# Patient Record
Sex: FEMALE | Race: WHITE | NOT HISPANIC OR LATINO | Employment: OTHER | URBAN - METROPOLITAN AREA
[De-identification: names, ages, dates, MRNs, and addresses within clinical notes are randomized per-mention and may not be internally consistent; named-entity substitution may affect disease eponyms.]

---

## 2018-08-08 ENCOUNTER — TRANSFERRED RECORDS (OUTPATIENT)
Dept: HEALTH INFORMATION MANAGEMENT | Facility: CLINIC | Age: 68
End: 2018-08-08

## 2018-08-14 ENCOUNTER — TRANSFERRED RECORDS (OUTPATIENT)
Dept: HEALTH INFORMATION MANAGEMENT | Facility: CLINIC | Age: 68
End: 2018-08-14

## 2018-08-22 ENCOUNTER — PRE VISIT (OUTPATIENT)
Dept: TRANSPLANT | Facility: CLINIC | Age: 68
End: 2018-08-22

## 2018-08-22 NOTE — TELEPHONE ENCOUNTER
Date of appointment: 8/30/2018   Diagnosis/reason for appointment:  CLL    Additional information:  Records from [facility] (in [place]) and [facility]    August 22, 2018 7:47 AM  Records received from [facility] and faxed to [facility]

## 2018-08-30 ENCOUNTER — OFFICE VISIT (OUTPATIENT)
Dept: TRANSPLANT | Facility: CLINIC | Age: 68
End: 2018-08-30
Attending: INTERNAL MEDICINE
Payer: MEDICARE

## 2018-08-30 VITALS
HEART RATE: 89 BPM | TEMPERATURE: 96.7 F | BODY MASS INDEX: 24.19 KG/M2 | OXYGEN SATURATION: 96 % | DIASTOLIC BLOOD PRESSURE: 78 MMHG | WEIGHT: 136.5 LBS | HEIGHT: 63 IN | RESPIRATION RATE: 16 BRPM | SYSTOLIC BLOOD PRESSURE: 124 MMHG

## 2018-08-30 DIAGNOSIS — C91.10 CLL (CHRONIC LYMPHOCYTIC LEUKEMIA) (H): Primary | ICD-10-CM

## 2018-08-30 PROCEDURE — G0463 HOSPITAL OUTPT CLINIC VISIT: HCPCS | Mod: ZF

## 2018-08-30 RX ORDER — MULTIPLE VITAMINS W/ MINERALS TAB 9MG-400MCG
1 TAB ORAL DAILY
COMMUNITY

## 2018-08-30 ASSESSMENT — PAIN SCALES - GENERAL: PAINLEVEL: NO PAIN (0)

## 2018-08-30 NOTE — NURSING NOTE
"Oncology Rooming Note    August 30, 2018 2:08 PM   Ana Francois is a 67 year old female who presents for:    Chief Complaint   Patient presents with     RECHECK     New Patient : CLL     Initial Vitals: /78 (BP Location: Right arm, Patient Position: Sitting, Cuff Size: Adult Regular)  Pulse 89  Temp 96.7  F (35.9  C) (Oral)  Resp 16  Ht 1.6 m (5' 3\")  Wt 61.9 kg (136 lb 8 oz)  SpO2 96%  BMI 24.18 kg/m2 Estimated body mass index is 24.18 kg/(m^2) as calculated from the following:    Height as of this encounter: 1.6 m (5' 3\").    Weight as of this encounter: 61.9 kg (136 lb 8 oz). Body surface area is 1.66 meters squared.  No Pain (0) Comment: Data Unavailable   No LMP recorded. Patient is postmenopausal.  Allergies reviewed: Yes  Medications reviewed: Yes    Medications: Medication refills not needed today.  Pharmacy name entered into [facility]: [facility] [address] [place] [address]    Clinical concerns: Patient states no further concerns at this time.     10 minutes for nursing intake (face to face time)     Susie Felton CMA              "

## 2018-08-30 NOTE — LETTER
2018       RE: Ana Francois  [address]  [place] [address]     Dear Colleague,    Thank you for referring your patient, Ana Francois, to the [facility] BLOOD AND MARROW TRANSPLANT. Please see a copy of my visit note below.       [facility] Oncology New Consult          Phuong Francois MRN# [id number]   Age: 67 year old YOB: 1950        Reason for consult: CLL       HPI:     67-year-old female with history of CLL is here for a second opinion for CLL.  Her regular oncologist is Dr. Judith Lopez from [place] [facility].      Diagnosis: CLL diagnosed in 2017, with normal cyogenetics  Treatment: No Rx    Oncologic history: In 2017, patient went to PCP for annual exam and was found to have left lump in the neck, cervical lymphadenopathy. CBC showed white count of 24.8 thousand, hemoglobin 13.7, platelet count of 204,000.  With 80% lymphocytes in the differential.  Flow cytometry from peripheral blood consistent with CLL: showed a population of lambda restricted B lymphocytes with positive CD23, CD43 and CD5. (CD19 brightly positive, moderately positive CD79b/CD45/CD23/CD22/CD43/CD5 and dimly positive for CD20 negative markers for CD2/CD 3/CD34///kappa/FMC 7/ CD10/ CD38).   Cytogenetics: Normal cytogenetics with no 17 P deletion or 11 q. Deletion  Hepatitis B and hepatitis C are non-reactive  Ig, IgA: 61, IgM: 33  Stage at diagnosis: Byrne stage I     Most recent CBC on 2018 shows WBC of 83,000, hemoglobin 13.4%, platelets 160 with differential showing 77% lymphocytes.  His chemistries sodium 143, potassium 4.2, chloride 107, bicarb 27, BUN 15, creatinine 0.86, calcium 9, albumin 4.3, total protein 6.5, total bilirubin 0.6, alkaline phosphatase 87, AST 30, ALT 19, , GFR 70    CT imaging 2018    CT of the neck on 2018 showing diffuse lymphadenopathy right neck level 5 lymph node 2.1 into 1.3 cm and left-sided level 5 lymph  node 1.5 and 1.2 cm  CT of the chest abdomen pelvis shows right axillary lymph node 3.1 into 1.6 cm and left axillary lymph node 3.3 and 1.6 cm.  Spleen is 10.7 into 11.2 into 9.3 cm.  Left para-aortic lymph nodes measures 2.5 and 1.8 cm.  She also has significant pelvic and retroperitoneal lymphadenopathy with the largest being right external iliac at 3.4 and 2.5 cm, no enlarged bilateral inguinal lymphadenopathy.    Interval Hx:  Over the last few months she has noticed gradual increase in size of LN in the necka nd noticed new LN in the groin and axilla. She however denies any other symptoms. No fever, night sweats and chills, wt loss, abdominal pain, nausea, vomiting, diarrhea, infectious symptoms. She has noticed some swelling in the legs, rt >lt since 2-3 months. It is stable. ROS is also positive for rash, itchy, raised and red, random occurrence like bug bite. She started having hives and itching a/w rash. She is taking benadryl on and off and steroid cream and seems to be under control. Dermatology biopsied one site which showed bug bite.     Review of system: Complete ROS otherwise negative         Past Medical History:    No significant PMHx  Basal cell Ca excised         Past Surgical History:   Gall bladder resection >10 yrs  Bunion surgery  Back surgery, microdiscetomy, herniated disc           Social History:     Social History     Social History     Marital status:      Spouse name: N/A     Number of children: N/A     Years of education: N/A     Occupational History     Not on file.     Social History Main Topics     Smoking status: Not on file     Smokeless tobacco: Not on file     Alcohol use Not on file     Drug use: Not on file     Sexual activity: Not on file     Other Topics Concern     Not on file     Social History Narrative   Drink occasionally once 2-3 weeks  Was a nurse, [employer]  Lives with , [place]  3 kids, 38, 37 and 32yrs.            Family History:   Great  "aunt: Breast cancer  FGM: Uterine cancer  Father: heart problems, mitral valve, CHF  Mother: Necrotic bowel          Allergies:   . Allergies not on file          Medications:     Current Outpatient Prescriptions   Medication     Ascorbic Acid (VITAMIN C PO)     DiphenhydrAMINE HCl (BENADRYL PO)     IBUPROFEN PO     multivitamin, therapeutic with minerals (MULTI-VITAMIN) TABS tablet     No current facility-administered medications for this visit.            Vital signs:                         Estimated body mass index is 24.18 kg/(m^2) as calculated from the following:    Height as of this encounter: 1.6 m (5' 3\").    Weight as of this encounter: 61.9 kg (136 lb 8 oz).    Physical exam:    Gen: Well built and nourished, not in any acute distress  HEENT: Mucous Membrane Moist, no oral lesions, no pallor, icterus  Neck: supple, b/l cervical LN in lower neck 1-2 cm, mobile and discrete  Lymph Nodes: B/l axillary LN rt >lt, multiple 2-3 cm, mobile and discrete, b/l inguinal LN 1-2 cm multiple  CVS: Regular Rate Rhythm, no murmurs  Resp: CTA, no added sounds  GI: Soft, non-tender, no Hepatospleenomegaly  Extremities: no edema   Skin: no bruises  Neuro: AAOx4. Cranial nerves grossly intact. Strength 5/5 throughout. Sensations grossly intact.          Data:   The labs and imaging were reviewed.    Assessment and Plan    #CLL, FERNANDEZ stage 1, normal risk cytogenetics-   Pt is otherwise healthy and relatively asymptomatic. She does not have high risk cytogenetics or cytopenias nor  bulk LN or splenomegaly which are all indications for treatment. Therefore, watchful waiting with regular CBC, CMP and imaging (only if indicated) is appropriate. Should she develop any these symptoms in future we can consider treatment.     We discussed about natural Hx of CLL and indications for treatment. She asked several intelligent questions which were answered to her satisfaction. She is happy to f/u with her regular Oncologist. She can " "contact us for any questions.    Staffed with Dr. Topete.    Surinder Terrazas  [facility] fellow  [phone]      Surinder Terrazas MD       Attending Addendum:  The patient was seen and evaluated. All medical records, testing results were reviewed and the plan was discussed with the fellow. The note above has been edited to reflect my findings.    Additional comments:  Pleasant 68 yo woman with CLL (without high risk cytogenetics) diagnosed in 2017 with steady WBC progression over the last year and some non-bulky KELSEY in the neck and above and below the diaphragm but with adequate Hgb and platelets and no B symptoms.    As above we reviewed CLL natural history, CLL risk statification by FISH/Cytogenetics, and approaches to observation and treatment. We agree with the current approach of observation with regular CBC checks and physical exams. We conveyed the importance of annual flu shots, vigilance about infection management, and consideration of \"killed\" shingles vaccine (shingrix) and to avoid any live viruses.     She is in agreement with the above and in agreement with observation and was happy to hear the consensus in recommendations. I gave her my card if questions come up in the future but she will be returning to her regular oncologist for ongoing management.    Mis Topete      Again, thank you for allowing me to participate in the care of your patient.      Sincerely,    Mis Topete MD    "

## 2018-08-30 NOTE — MR AVS SNAPSHOT
"              After Visit Summary   8/30/2018    Ana Francois    MRN: [id number]           Patient Information     Date Of Birth          1950        Visit Information        Provider Department      8/30/2018 2:00 PM Mis Topete MD [facility] Blood and Marrow Transplant        Today's Diagnoses     CLL (chronic lymphocytic leukemia) (H)    -  1          [facility] ([facility])  [address] [place] [address]  Phone: [phone]  Clinic Hours:   Monday-Thursday:7am to 7pm   Friday: 7am to 5pm   Weekends and holidays:    8am to noon (in general)  If your fever is 100.5  or greater,   call the clinic.  After hours call the   hospital at [phone] or   [phone]. Ask for the BMT   fellow on-call            Follow-ups after your visit        Who to contact     If you have questions or need follow up information about today's clinic visit or your schedule please contact [facility] BLOOD AND MARROW TRANSPLANT directly at [phone].  Normal or non-critical lab and imaging results will be communicated to you by MyChart, letter or phone within 4 business days after the clinic has received the results. If you do not hear from us within 7 days, please contact the clinic through MyChart or phone. If you have a critical or abnormal lab result, we will notify you by phone as soon as possible.  Submit refill requests through [employer] or call your pharmacy and they will forward the refill request to us. Please allow 3 business days for your refill to be completed.          Additional Information About Your Visit        Care EveryWhere ID     This is your Care EveryWhere ID. This could be used by other organizations to access your [facility] medical records  [id number]        Your Vitals Were     Pulse Temperature Respirations Height Pulse Oximetry BMI (Body Mass Index)    89 96.7  F (35.9  C) (Oral) 16 1.6 m (5' 3\") 96% 24.18 kg/m2       Blood Pressure from Last 3 Encounters: "   08/30/18 124/78    Weight from Last 3 Encounters:   08/30/18 61.9 kg (136 lb 8 oz)              Today, you had the following     No orders found for display       Recent Review Flowsheet Data     There is no flowsheet data to display.               Primary Care Provider Office Phone # Fax #    Lili Sanders [phone]       [facility] [address] [facility] [address]        Equal Access to Services     YAZAN BURK : Hadii aad ku hadasho Soomaali, waaxda luqadaha, qaybta kaalmada adeegyada, waxay idiin hayaan adeeg kharash la'luís . So [facility] [phone].    ATENCIÓN: Si habla español, tiene a higuera disposición servicios gratuitos de asistencia lingüística. Llame al [phone].    We comply with applicable federal civil rights laws and [place] laws. We do not discriminate on the basis of race, color, national origin, age, disability, sex, sexual orientation, or gender identity.            Thank you!     Thank you for choosing [facility] BLOOD AND MARROW TRANSPLANT  for your care. Our goal is always to provide you with excellent care. Hearing back from our patients is one way we can continue to improve our services. Please take a few minutes to complete the written survey that you may receive in the mail after your visit with us. Thank you!             Your Updated Medication List - Protect others around you: Learn how to safely use, store and throw away your medicines at www.disposemymeds.org.          This list is accurate as of 8/30/18  4:32 PM.  Always use your most recent med list.                   Brand Name Dispense Instructions for use Diagnosis    BENADRYL PO           IBUPROFEN PO           Multi-vitamin Tabs tablet      Take 1 tablet by mouth daily        VITAMIN C PO

## 2018-12-20 ENCOUNTER — TRANSFERRED RECORDS (OUTPATIENT)
Dept: HEALTH INFORMATION MANAGEMENT | Facility: CLINIC | Age: 68
End: 2018-12-20

## 2019-05-17 ENCOUNTER — TRANSFERRED RECORDS (OUTPATIENT)
Dept: HEALTH INFORMATION MANAGEMENT | Facility: CLINIC | Age: 69
End: 2019-05-17

## 2020-01-30 ENCOUNTER — TRANSFERRED RECORDS (OUTPATIENT)
Dept: HEALTH INFORMATION MANAGEMENT | Facility: CLINIC | Age: 70
End: 2020-01-30

## 2021-05-28 ENCOUNTER — RECORDS - HEALTHEAST (OUTPATIENT)
Dept: ADMINISTRATIVE | Facility: CLINIC | Age: 71
End: 2021-05-28

## 2024-03-11 ENCOUNTER — TRANSFERRED RECORDS (OUTPATIENT)
Dept: HEALTH INFORMATION MANAGEMENT | Facility: CLINIC | Age: 74
End: 2024-03-11
Payer: COMMERCIAL

## 2024-04-04 ENCOUNTER — TRANSFERRED RECORDS (OUTPATIENT)
Dept: HEALTH INFORMATION MANAGEMENT | Facility: CLINIC | Age: 74
End: 2024-04-04

## 2024-04-04 ENCOUNTER — HOSPITAL ENCOUNTER (EMERGENCY)
Facility: CLINIC | Age: 74
End: 2024-04-04
Payer: MEDICARE

## 2024-04-04 ENCOUNTER — HOSPITAL ENCOUNTER (EMERGENCY)
Facility: CLINIC | Age: 74
Discharge: OTHER INSTITUTION | End: 2024-04-04
Attending: FAMILY MEDICINE | Admitting: FAMILY MEDICINE
Payer: COMMERCIAL

## 2024-04-04 VITALS
HEIGHT: 63 IN | WEIGHT: 130 LBS | HEART RATE: 71 BPM | TEMPERATURE: 97.5 F | SYSTOLIC BLOOD PRESSURE: 172 MMHG | BODY MASS INDEX: 23.04 KG/M2 | DIASTOLIC BLOOD PRESSURE: 74 MMHG | OXYGEN SATURATION: 97 % | RESPIRATION RATE: 17 BRPM

## 2024-04-04 DIAGNOSIS — R07.9 CHEST PAIN, UNSPECIFIED TYPE: ICD-10-CM

## 2024-04-04 LAB
ALBUMIN SERPL BCG-MCNC: 4.1 G/DL (ref 3.5–5.2)
ALP SERPL-CCNC: 89 U/L (ref 40–150)
ALT SERPL W P-5'-P-CCNC: 26 U/L (ref 0–50)
ANION GAP SERPL CALCULATED.3IONS-SCNC: 8 MMOL/L (ref 7–15)
AST SERPL W P-5'-P-CCNC: 49 U/L (ref 0–45)
ATRIAL RATE - MUSE: 68 BPM
BASOPHILS # BLD AUTO: 0 10E3/UL (ref 0–0.2)
BASOPHILS NFR BLD AUTO: 0 %
BILIRUB DIRECT SERPL-MCNC: <0.2 MG/DL (ref 0–0.3)
BILIRUB SERPL-MCNC: 0.3 MG/DL
BUN SERPL-MCNC: 12.6 MG/DL (ref 8–23)
CALCIUM SERPL-MCNC: 8.6 MG/DL (ref 8.8–10.2)
CHLORIDE SERPL-SCNC: 105 MMOL/L (ref 98–107)
CREAT SERPL-MCNC: 0.61 MG/DL (ref 0.51–0.95)
DEPRECATED HCO3 PLAS-SCNC: 28 MMOL/L (ref 22–29)
DIASTOLIC BLOOD PRESSURE - MUSE: 75 MMHG
EGFRCR SERPLBLD CKD-EPI 2021: >90 ML/MIN/1.73M2
EOSINOPHIL # BLD AUTO: 0 10E3/UL (ref 0–0.7)
EOSINOPHIL NFR BLD AUTO: 0 %
ERYTHROCYTE [DISTWIDTH] IN BLOOD BY AUTOMATED COUNT: 13.2 % (ref 10–15)
GLUCOSE SERPL-MCNC: 138 MG/DL (ref 70–99)
HCT VFR BLD AUTO: 36.5 % (ref 35–47)
HGB BLD-MCNC: 11.9 G/DL (ref 11.7–15.7)
IMM GRANULOCYTES # BLD: 0 10E3/UL
IMM GRANULOCYTES NFR BLD: 0 %
INTERPRETATION ECG - MUSE: NORMAL
LIPASE SERPL-CCNC: 26 U/L (ref 13–60)
LYMPHOCYTES # BLD AUTO: 2.6 10E3/UL (ref 0.8–5.3)
LYMPHOCYTES NFR BLD AUTO: 25 %
MAGNESIUM SERPL-MCNC: 2 MG/DL (ref 1.7–2.3)
MCH RBC QN AUTO: 30.9 PG (ref 26.5–33)
MCHC RBC AUTO-ENTMCNC: 32.6 G/DL (ref 31.5–36.5)
MCV RBC AUTO: 95 FL (ref 78–100)
MONOCYTES # BLD AUTO: 0.2 10E3/UL (ref 0–1.3)
MONOCYTES NFR BLD AUTO: 2 %
NEUTROPHILS # BLD AUTO: 7.4 10E3/UL (ref 1.6–8.3)
NEUTROPHILS NFR BLD AUTO: 72 %
NRBC # BLD AUTO: 0 10E3/UL
NRBC BLD AUTO-RTO: 0 /100
NT-PROBNP SERPL-MCNC: 249 PG/ML (ref 0–900)
P AXIS - MUSE: 74 DEGREES
PLATELET # BLD AUTO: 181 10E3/UL (ref 150–450)
POTASSIUM SERPL-SCNC: 3.9 MMOL/L (ref 3.4–5.3)
PR INTERVAL - MUSE: 152 MS
PROT SERPL-MCNC: 5.7 G/DL (ref 6.4–8.3)
QRS DURATION - MUSE: 78 MS
QT - MUSE: 418 MS
QTC - MUSE: 444 MS
R AXIS - MUSE: 74 DEGREES
RBC # BLD AUTO: 3.85 10E6/UL (ref 3.8–5.2)
SODIUM SERPL-SCNC: 141 MMOL/L (ref 135–145)
SYSTOLIC BLOOD PRESSURE - MUSE: 177 MMHG
T AXIS - MUSE: 82 DEGREES
TROPONIN T SERPL HS-MCNC: 14 NG/L
TROPONIN T SERPL HS-MCNC: 15 NG/L
VENTRICULAR RATE- MUSE: 68 BPM
WBC # BLD AUTO: 10.3 10E3/UL (ref 4–11)

## 2024-04-04 PROCEDURE — 99284 EMERGENCY DEPT VISIT MOD MDM: CPT | Mod: 25

## 2024-04-04 PROCEDURE — 82248 BILIRUBIN DIRECT: CPT | Performed by: FAMILY MEDICINE

## 2024-04-04 PROCEDURE — 96374 THER/PROPH/DIAG INJ IV PUSH: CPT

## 2024-04-04 PROCEDURE — 36415 COLL VENOUS BLD VENIPUNCTURE: CPT | Performed by: FAMILY MEDICINE

## 2024-04-04 PROCEDURE — 83690 ASSAY OF LIPASE: CPT | Performed by: FAMILY MEDICINE

## 2024-04-04 PROCEDURE — 85025 COMPLETE CBC W/AUTO DIFF WBC: CPT | Performed by: FAMILY MEDICINE

## 2024-04-04 PROCEDURE — 83735 ASSAY OF MAGNESIUM: CPT | Performed by: FAMILY MEDICINE

## 2024-04-04 PROCEDURE — 250N000011 HC RX IP 250 OP 636: Performed by: FAMILY MEDICINE

## 2024-04-04 PROCEDURE — 83880 ASSAY OF NATRIURETIC PEPTIDE: CPT | Performed by: FAMILY MEDICINE

## 2024-04-04 PROCEDURE — 84484 ASSAY OF TROPONIN QUANT: CPT | Performed by: FAMILY MEDICINE

## 2024-04-04 PROCEDURE — 93005 ELECTROCARDIOGRAM TRACING: CPT | Performed by: FAMILY MEDICINE

## 2024-04-04 PROCEDURE — 80048 BASIC METABOLIC PNL TOTAL CA: CPT | Performed by: FAMILY MEDICINE

## 2024-04-04 RX ORDER — ONDANSETRON 2 MG/ML
4 INJECTION INTRAMUSCULAR; INTRAVENOUS ONCE
Status: COMPLETED | OUTPATIENT
Start: 2024-04-04 | End: 2024-04-04

## 2024-04-04 RX ADMIN — ONDANSETRON 4 MG: 2 INJECTION INTRAMUSCULAR; INTRAVENOUS at 11:47

## 2024-04-04 ASSESSMENT — COLUMBIA-SUICIDE SEVERITY RATING SCALE - C-SSRS
1. IN THE PAST MONTH, HAVE YOU WISHED YOU WERE DEAD OR WISHED YOU COULD GO TO SLEEP AND NOT WAKE UP?: NO
2. HAVE YOU ACTUALLY HAD ANY THOUGHTS OF KILLING YOURSELF IN THE PAST MONTH?: NO
6. HAVE YOU EVER DONE ANYTHING, STARTED TO DO ANYTHING, OR PREPARED TO DO ANYTHING TO END YOUR LIFE?: NO

## 2024-04-04 ASSESSMENT — ACTIVITIES OF DAILY LIVING (ADL)
ADLS_ACUITY_SCORE: 35

## 2024-04-04 NOTE — ED NOTES
Pt given discharge papers, awaiting ride from [facility] staff. IV's in place until they arrive. Pt knows to not cross legs.

## 2024-04-04 NOTE — ED NOTES
Pt states her epigstic pain is better. Pure wick connected per pt choice, offered fracture pan as well. Denies hip  pain. Son outside the room at this moment while pt voids.

## 2024-04-04 NOTE — ED NOTES
Expected Patient Referral to ED  10:09 AM    Referring Clinic/Provider:  [facility]  Reason for referral/Clinical facts:  Patient underwent total hip replacement, on waking from anesthesia complained of epigastric pain partly relieved with antacid, reported ST depressions on EKG    Recommendations provided:  Send to ED for further evaluation    Caller was informed that this institution does possess the capabilities and/or resources to provide for patient and should be transferred to our facility.    Discussed that if direct admit is sought and any hurdles are encountered, this ED would be happy to see the patient and evaluate.    Informed caller that recommendations provided are recommendations based only on the facts provided and that they responsible to accept or reject the advice, or to seek a formal in person consultation as needed and that this ED will see/treat patient should they arrive.      Goyo Al MD  [facility] EMERGENCY ROOM  [address] [place] [id number]  [phone]     Goyo Al MD  04/04/24 1010

## 2024-04-04 NOTE — ED TRIAGE NOTES
Had right hip surgery this am. After surgery began having low sternal, epigastric discomfort. Son reports that he was told that there was some EKG changes from pre and post op surgery. Denies shortness of breath, nausea or vomiting.  Received Oxycodone after surgery and EMS gave      Triage Assessment (Adult)       Row Name 04/04/24 1114          Triage Assessment    Airway WDL WDL        Respiratory WDL    Respiratory WDL WDL        Cardiac WDL    Cardiac WDL X;chest pain     Cardiac Rhythm NSR        Chest Pain Assessment    Chest Pain Location epigastric

## 2024-04-04 NOTE — ED NOTES
Pt report to [facility] facility. Pt left via wheelchair. Son had belongings. IV left in place per their nurse request. Pt moves/transfer very well to Wheelchair. Pt to go to [facility] recovery suite at the [facility] that was pre arranged.

## 2024-04-04 NOTE — ED PROVIDER NOTES
EMERGENCY DEPARTMENT ENCOUNTER      NAME: Kalee Caldera  AGE: 73 year old female  YOB: 1950  MRN: [id number]  EVALUATION DATE & TIME: No admission date for patient encounter.    PCP: Lili Sanders    ED PROVIDER: Goyo Al M.D.    Chief Complaint   Patient presents with    Chest Pain       FINAL IMPRESSION:  1. Chest pain, unspecified type        ED COURSE & MEDICAL DECISION MAKING:    Reviewed most recent primary care office visit from March 11, 2024 which was preop for hip replacement that was done today, at that time patient was just stable for procedure and low risk for complications, also has a history of CLL.    ED Course as of 04/04/24 1419   Thu Apr 04, 2024   1121 Patient seen and examined the lobby due to critical capacity in the emergency department.  Patient underwent total hip replacement today, postprocedure in recovery complained of some epigastric pain, by report EKG had some ST depressions at that time and EMS was called.  EKG in the emergency department is normal, I did review the EKG done when patient was having her chest pain and it does show some slightly concave ST segments in 2-3 and aVF but these are essentially unchanged from preoperative EKG and the EKG done in the emergency department.  Patient is complaining of some nausea, no abdominal tenderness, Zofran is ordered   1221 Labs ordered and independently interpreted by me with normal hepatic panel, normal CBC, normal basic metabolic panel, normal BNP, troponin 14 which given time since onset of symptoms is indeterminate but likely is negative.  Repeat troponin will be done and if negative patient can be discharged to [facility] for her postoperative stay   1259 Care discussed with LUCY Grewal regarding disposition planning.  Can contact nursing station Bassam Singh at [facility]- [phone].  Updated patient and spouse with findings so far, diagnosis, and plan   1417 Repeat troponin 15, this does not  represent a significant change.  Patient updated and stable for discharge.  Contacted [facility] who will send transport for discharge       At the conclusion of the encounter I discussed the results of all of the tests and the disposition. The questions were answered. The patient or family acknowledged understanding and was agreeable with the care plan.     Medical Decision Making  Obtained supplemental history:Supplemental history obtained?: Other: nurse triage note/ EMS and patient's Son  Reviewed external records: External records reviewed?: Documented in chart  Care impacted by chronic illness:Cancer/Chemotherapy and Hyperlipidemia  Care significantly affected by social determinants of health:N/A  Did you consider but not order tests?: Work up considered but not performed and documented in chart, if applicable  Did you interpret images independently?: Independent interpretation of ECG and images noted in documentation, when applicable.  Consultation discussion with other provider:Did you involve another provider (consultant, MH, pharmacy, etc.)?: I discussed the care with another health care provider, see documentation for details.  Discharge. No recommendations on prescription strength medication(s). I considered admission, but discharged the patient after share decision making conversation.    EKG:    Performed at: 11:09 AM  Impression: Normal EKG  Rate: 68  Rhythm: Sinus  Axis: Normal  CT Interval: 152  QRS Interval: 78  QTc Interval: 444  ST Changes: No acute ischemic changes  Comparison: Prior from [facility] from March 11, 2024, no changes    I have independently reviewed and interpreted the EKG(s) documented above.    PROCEDURES:       MEDICATIONS GIVEN IN THE EMERGENCY:  Medications   ondansetron (ZOFRAN) injection 4 mg (4 mg Intravenous $Given 4/4/24 0465)       NEW PRESCRIPTIONS STARTED AT TODAY'S ER VISIT  New Prescriptions    No medications on file        =================================================================    HPI    Patient information was obtained from: Nurse triage note, Patient's Son and Patient      Kalee Caldera is a 73 year old female with a pertinent history of s/p total right hip placement (4/04/2024), HLD, chronic lymphocytic leukemia who presents to this ED by EMS for evaluation of chest pain.    Patient comes in by EMS today (4/04/24) after she started having lower sternal/ mid-epigastric discomfort. This started after her right hip surgery at [facility] this morning.     Per nurse triage note, her son had reported that she had some EKG changes from pre and post op surgery. EMS gave her 324 ASA. She recieved oxycodone after her surgery.     Patient repeorts some nausea and mid chest discmofort but that she overall feels fine. Also states her plan after surgery is to stay at the [facility] hotel.     She denied any shortness of breath, nausea, or vomiting.  She currently takes rosuvastatin. Allergies to sulfa noted. She has no prior cardiac history. No other complaints at this time.    REVIEW OF SYSTEMS   Review of Systems   All other systems reviewed and negative    PAST MEDICAL HISTORY:  History reviewed. No pertinent past medical history.    PAST SURGICAL HISTORY:  No past surgical history on file.    CURRENT MEDICATIONS:    No current facility-administered medications for this encounter.     Current Outpatient Medications   Medication Sig Dispense Refill    Ascorbic Acid (VITAMIN C PO)       DiphenhydrAMINE HCl (BENADRYL PO)       IBUPROFEN PO       multivitamin, therapeutic with minerals (MULTI-VITAMIN) TABS tablet Take 1 tablet by mouth daily         ALLERGIES:  Allergies   Allergen Reactions    Adhesive Tape Itching    Allopurinol Rash    Sulfa Antibiotics Rash    Trimethoprim Rash       FAMILY HISTORY:  No family history on file.    SOCIAL HISTORY:   Social History     Socioeconomic History    Marital status:    Tobacco Use  "   Smoking status: Never    Smokeless tobacco: Never   Substance and Sexual Activity    Alcohol use: Yes    Drug use: No       VITALS:  BP (!) 185/87   Pulse 81   Temp 97.5  F (36.4  C) (Temporal)   Resp 22   Ht 1.6 m (5' 3\")   Wt 59 kg (130 lb)   SpO2 96%   BMI 23.03 kg/m      PHYSICAL EXAM:  Physical Exam  Vitals and nursing note reviewed.   Constitutional:       Appearance: Normal appearance.   HENT:      Head: Normocephalic and atraumatic.      Right Ear: External ear normal.      Left Ear: External ear normal.      Nose: Nose normal.      Mouth/Throat:      Mouth: Mucous membranes are moist.   Eyes:      Extraocular Movements: Extraocular movements intact.      Conjunctiva/sclera: Conjunctivae normal.      Pupils: Pupils are equal, round, and reactive to light.   Cardiovascular:      Rate and Rhythm: Normal rate and regular rhythm.   Pulmonary:      Effort: Pulmonary effort is normal.      Breath sounds: Normal breath sounds. No wheezing or rales.   Abdominal:      General: Abdomen is flat. There is no distension.      Palpations: Abdomen is soft.      Tenderness: There is no abdominal tenderness. There is no guarding.   Musculoskeletal:         General: Normal range of motion.      Cervical back: Normal range of motion and neck supple.      Right lower leg: No edema.      Left lower leg: No edema.   Lymphadenopathy:      Cervical: No cervical adenopathy.   Skin:     General: Skin is warm and dry.   Neurological:      General: No focal deficit present.      Mental Status: She is alert and oriented to person, place, and time. Mental status is at baseline.      Comments: No gross focal neurologic deficits   Psychiatric:         Mood and Affect: Mood normal.         Behavior: Behavior normal.         Thought Content: Thought content normal.          LAB:  All pertinent labs reviewed and interpreted.  Results for orders placed or performed during the hospital encounter of 04/04/24   Basic metabolic panel "   Result Value Ref Range    Sodium 141 135 - 145 mmol/L    Potassium 3.9 3.4 - 5.3 mmol/L    Chloride 105 98 - 107 mmol/L    Carbon Dioxide (CO2) 28 22 - 29 mmol/L    Anion Gap 8 7 - 15 mmol/L    Urea Nitrogen 12.6 8.0 - 23.0 mg/dL    Creatinine 0.61 0.51 - 0.95 mg/dL    GFR Estimate >90 >60 mL/min/1.73m2    Calcium 8.6 (L) 8.8 - 10.2 mg/dL    Glucose 138 (H) 70 - 99 mg/dL   Result Value Ref Range    Troponin T, High Sensitivity 14 <=14 ng/L   Result Value Ref Range    Magnesium 2.0 1.7 - 2.3 mg/dL   Hepatic function panel   Result Value Ref Range    Protein Total 5.7 (L) 6.4 - 8.3 g/dL    Albumin 4.1 3.5 - 5.2 g/dL    Bilirubin Total 0.3 <=1.2 mg/dL    Alkaline Phosphatase 89 40 - 150 U/L    AST 49 (H) 0 - 45 U/L    ALT 26 0 - 50 U/L    Bilirubin Direct <0.20 0.00 - 0.30 mg/dL   Result Value Ref Range    Lipase 26 13 - 60 U/L   Nt probnp inpatient (BNP)   Result Value Ref Range    N terminal Pro BNP Inpatient 249 0 - 900 pg/mL   CBC with platelets and differential   Result Value Ref Range    WBC Count 10.3 4.0 - 11.0 10e3/uL    RBC Count 3.85 3.80 - 5.20 10e6/uL    Hemoglobin 11.9 11.7 - 15.7 g/dL    Hematocrit 36.5 35.0 - 47.0 %    MCV 95 78 - 100 fL    MCH 30.9 26.5 - 33.0 pg    MCHC 32.6 31.5 - 36.5 g/dL    RDW 13.2 10.0 - 15.0 %    Platelet Count 181 150 - 450 10e3/uL    % Neutrophils 72 %    % Lymphocytes 25 %    % Monocytes 2 %    % Eosinophils 0 %    % Basophils 0 %    % Immature Granulocytes 0 %    NRBCs per 100 WBC 0 <1 /100    Absolute Neutrophils 7.4 1.6 - 8.3 10e3/uL    Absolute Lymphocytes 2.6 0.8 - 5.3 10e3/uL    Absolute Monocytes 0.2 0.0 - 1.3 10e3/uL    Absolute Eosinophils 0.0 0.0 - 0.7 10e3/uL    Absolute Basophils 0.0 0.0 - 0.2 10e3/uL    Absolute Immature Granulocytes 0.0 <=0.4 10e3/uL    Absolute NRBCs 0.0 10e3/uL   Result Value Ref Range    Troponin T, High Sensitivity 15 (H) <=14 ng/L       RADIOLOGY:  Reviewed all pertinent imaging. Please see official radiology report.  No orders to  display       I, Bestcierra Melvindavion , am serving as a scribe to document services personally performed by Dr. Al based on my observation and the provider's statements to me. I, Goyo Al MD attest that Juanita Figueroa  is acting in a scribe capacity, has observed my performance of the services and has documented them in accordance with my direction.    Goyo Al M.D.  Emergency Medicine  [facility] EMERGENCY ROOM  [address] [address]  [phone]  Dept: [phone]       Goyo Al MD  04/04/24 2584

## 2024-07-29 ENCOUNTER — OFFICE VISIT (OUTPATIENT)
Dept: CARDIOLOGY | Facility: CLINIC | Age: 74
End: 2024-07-29
Attending: FAMILY MEDICINE
Payer: COMMERCIAL

## 2024-07-29 VITALS
DIASTOLIC BLOOD PRESSURE: 64 MMHG | SYSTOLIC BLOOD PRESSURE: 107 MMHG | HEART RATE: 81 BPM | OXYGEN SATURATION: 97 % | BODY MASS INDEX: 23.56 KG/M2 | WEIGHT: 133 LBS

## 2024-07-29 DIAGNOSIS — R94.31 ABNORMAL ELECTROCARDIOGRAM: ICD-10-CM

## 2024-07-29 DIAGNOSIS — R07.9 CHEST PAIN, UNSPECIFIED TYPE: ICD-10-CM

## 2024-07-29 DIAGNOSIS — R07.9 EXERTIONAL CHEST PAIN: Primary | ICD-10-CM

## 2024-07-29 PROCEDURE — 99204 OFFICE O/P NEW MOD 45 MIN: CPT | Performed by: INTERNAL MEDICINE

## 2024-07-29 RX ORDER — ROSUVASTATIN CALCIUM 5 MG/1
5 TABLET, COATED ORAL DAILY
COMMUNITY
Start: 2023-10-26

## 2024-07-29 RX ORDER — UBIDECARENONE 100 MG
100 CAPSULE ORAL 2 TIMES DAILY
COMMUNITY

## 2024-07-29 NOTE — LETTER
7/29/2024    Lili Tommy  [address]  [place] [address]    RE: Kalee Caldera       Dear Colleague,     I had the pleasure of seeing Kalee Caldera in the [facility].    Thank you, Dr. Al, for asking the [facility] team to see Ms. Kalee Caldera to evaluate       Assessment/Recommendations   Assessment/Plan:  Chest pressure/heaviness on exertion - no DM,HTN but post menopausal with abn ECG plan stress echo, if ischemia plan angiography, and if equivocal plan CT angiography  CV prevention - repeat fasting lipids.  Start aspirin 162mg daily unless stress echo normal.           History of Present Illness/Subjective    Ms. Kalee Caldera is a 73 year old female with CLL s/p chemo in 2019, last year slight rise in lymph, recent hip surgery and post op had chest ache, and again last week end was busy and had chest ache, was in the ED and trop 15, ECG NSR iwht possible ant MI, 1/2023 149, father with MVR when she was in highschool, no hx fmaily CVA, MI, no tob, no DM, HTN.  Planning trip to [place] in Oct,  no PND/orthopena, edema, sycnopale vents, no AVILEZ.  No strep throat as a child other than once.  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           Physical Examination Review of Systems   /64 (BP Location: Left arm, Patient Position: Sitting, Cuff Size: Adult Regular)   Pulse 81   Wt 60.3 kg (133 lb)   SpO2 97%   BMI 23.56 kg/m    Body mass index is 23.56 kg/m .  Wt Readings from Last 3 Encounters:   07/29/24 60.3 kg (133 lb)   04/04/24 59 kg (130 lb)   08/30/18 61.9 kg (136 lb 8 oz)     [unfilled]  General Appearance:   no distress, normal body habitus   ENT/Mouth: membranes moist, no oral lesions or bleeding gums.      EYES:  no scleral icterus, normal conjunctivae   Neck: no carotid bruits or thyromegaly   Chest/Lungs:   lungs are clear to auscultation, no rales or wheezing,  sternal scar, equal chest wall expansion     Cardiovascular:   Regular. Normal first and second heart sounds with no murmurs, rubs, or gallops; the carotid, radial and posterior tibial pulses are intact, Jugular venous pressure , edema bilaterally    Abdomen:  no organomegaly, masses, bruits, or tenderness; bowel sounds are present   Extremities: no cyanosis or clubbing   Skin: no xanthelasma, warm.    Neurologic: normal  bilateral, no tremors     Psychiatric: alert and oriented x3, calm     Review of Systems - 12 points nega other than above      Medical History  Surgical History Family History Social History   No past medical history on file. No past surgical history on file. No family history on file. Social History     Socioeconomic History    Marital status:      Spouse name: Not on file    Number of children: Not on file    Years of education: Not on file    Highest education level: Not on file   Occupational History    Not on file   Tobacco Use    Smoking status: Never    Smokeless tobacco: Never   Substance and Sexual Activity    Alcohol use: Yes    Drug use: No    Sexual activity: Not on file   Other Topics Concern    Not on file   Social History Narrative    Not on file     Social Determinants of Health     Financial Resource Strain: Low Risk  (5/1/2023)    [facility]    Financial Resource Strain     Difficulty of Paying Living Expenses: 3     Difficulty of Paying Living Expenses: Not on file   Food Insecurity: No Food Insecurity (5/1/2023)    [facility]    Food Insecurity     Worried About Running Out of Food in the Last Year: 1   Transportation Needs: No Transportation Needs (5/1/2023)    [facility]    Transportation Needs     Lack of Transportation  "(Medical): 1   Physical Activity: Not on file   Stress: Not on file   Social Connections: Unknown (5/1/2024)    [facility]    Social Connections     Frequency of Communication with Friends and Family: Not on file   Interpersonal Safety: Not on file   Housing Stability: Low Risk  (5/1/2023)    [facility] [employer] [facility], [employer]  [facility]    Housing Stability     Unable to Pay for Housing in the Last Year: 1          Medications  Allergies   Scheduled Meds:  No current facility-administered medications for this visit.     Continuous Infusions:  No current facility-administered medications for this visit.     PRN Meds:.  No current facility-administered medications for this visit.    Allergies   Allergen Reactions    Adhesive Tape Itching    Allopurinol Rash    Sulfa Antibiotics Rash    Trimethoprim Rash         Lab Results    Chemistry/lipid CBC Cardiac Enzymes/BNP/TSH/INR   Lab Results   Component Value Date    BUN 12.6 04/04/2024     04/04/2024    CO2 28 04/04/2024    Lab Results   Component Value Date    WBC 10.3 04/04/2024    HGB 11.9 04/04/2024    HCT 36.5 04/04/2024    MCV 95 04/04/2024     04/04/2024    No results found for: \"CKTOTAL\", \"CKMB\", \"TROPONINI\", \"BNP\", \"TSH\", \"INR\"           Bernardo Conner MD  Interventional Cardiology  [facility]              Thank you for allowing me to participate in the care of your patient.      Sincerely,     Bernardo Conner MD     [facility]  cc:   Goyo Al MD  [employer] [place] [address]      "

## 2024-07-29 NOTE — PROGRESS NOTES
Thank you, Dr. Al, for asking the [facility] team to see Ms. Kalee Caldera to evaluate       Assessment/Recommendations   Assessment/Plan:  Chest pressure/heaviness on exertion - no DM,HTN but post menopausal with abn ECG plan stress echo, if ischemia plan angiography, and if equivocal plan CT angiography  CV prevention - repeat fasting lipids.  Start aspirin 162mg daily unless stress echo normal.           History of Present Illness/Subjective    Ms. Kalee Caldera is a 73 year old female with CLL s/p chemo in 2019, last year slight rise in lymph, recent hip surgery and post op had chest ache, and again last week end was busy and had chest ache, was in the ED and trop 15, ECG NSR iwht possible ant MI, 1/2023 149, father with MVR when she was in highschool, no hx fmaily CVA, MI, no tob, no DM, HTN.  Planning trip to [facility] in Oct,  no PND/orthopena, edema, sycnopale vents, no AVILEZ.  No strep throat as a child other than once.  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           Physical Examination Review of Systems   /64 (BP Location: Left arm, Patient Position: Sitting, Cuff Size: Adult Regular)   Pulse 81   Wt 60.3 kg (133 lb)   SpO2 97%   BMI 23.56 kg/m    Body mass index is 23.56 kg/m .  Wt Readings from Last 3 Encounters:   07/29/24 60.3 kg (133 lb)   04/04/24 59 kg (130 lb)   08/30/18 61.9 kg (136 lb 8 oz)     [unfilled]  General Appearance:   no distress, normal body habitus   ENT/Mouth: membranes moist, no oral lesions or bleeding gums.      EYES:  no scleral icterus, normal conjunctivae   Neck: no carotid bruits or thyromegaly   Chest/Lungs:   lungs are clear to auscultation, no rales or wheezing,  sternal scar, equal chest wall expansion    Cardiovascular:   Regular. Normal first and second heart sounds with no murmurs, rubs, or gallops; the carotid, radial and posterior tibial pulses are intact, Jugular venous pressure , edema bilaterally    Abdomen:  no  organomegaly, masses, bruits, or tenderness; bowel sounds are present   Extremities: no cyanosis or clubbing   Skin: no xanthelasma, warm.    Neurologic: normal  bilateral, no tremors     Psychiatric: alert and oriented x3, calm     Review of Systems - 12 points nega other than above      Medical History  Surgical History Family History Social History   No past medical history on file. No past surgical history on file. No family history on file. Social History     Socioeconomic History    Marital status:      Spouse name: Not on file    Number of children: Not on file    Years of education: Not on file    Highest education level: Not on file   Occupational History    Not on file   Tobacco Use    Smoking status: Never    Smokeless tobacco: Never   Substance and Sexual Activity    Alcohol use: Yes    Drug use: No    Sexual activity: Not on file   Other Topics Concern    Not on file   Social History Narrative    Not on file     Social Determinants of Health     Financial Resource Strain: Low Risk  (5/1/2023)    [facility] [employer] [facility], [facility]    Financial Resource Strain     Difficulty of Paying Living Expenses: 3     Difficulty of Paying Living Expenses: Not on file   Food Insecurity: No Food Insecurity (5/1/2023)    [facility], [facility]    Food Insecurity     Worried About Running Out of Food in the Last Year: 1   Transportation Needs: No Transportation Needs (5/1/2023)    [facility], [facility]    Transportation Needs     Lack of Transportation (Medical): 1   Physical Activity: Not on file   Stress: Not on file   Social Connections: Unknown (5/1/2024)    Received from [facility] [employer] [facility]    Social Connections     Frequency of Communication with  "Friends and Family: Not on file   Interpersonal Safety: Not on file   Housing Stability: Low Risk  (5/1/2023)    Received from [facility], [facility] [employer] [facility]    Housing Stability     Unable to Pay for Housing in the Last Year: 1          Medications  Allergies   Scheduled Meds:  No current facility-administered medications for this visit.     Continuous Infusions:  No current facility-administered medications for this visit.     PRN Meds:.  No current facility-administered medications for this visit.    Allergies   Allergen Reactions    Adhesive Tape Itching    Allopurinol Rash    Sulfa Antibiotics Rash    Trimethoprim Rash         Lab Results    Chemistry/lipid CBC Cardiac Enzymes/BNP/TSH/INR   Lab Results   Component Value Date    BUN 12.6 04/04/2024     04/04/2024    CO2 28 04/04/2024    Lab Results   Component Value Date    WBC 10.3 04/04/2024    HGB 11.9 04/04/2024    HCT 36.5 04/04/2024    MCV 95 04/04/2024     04/04/2024    No results found for: \"CKTOTAL\", \"CKMB\", \"TROPONINI\", \"BNP\", \"TSH\", \"INR\"           Bernardo Conner MD  Interventional Cardiology  [facility]            "

## 2024-07-31 ENCOUNTER — LAB (OUTPATIENT)
Dept: CARDIOLOGY | Facility: CLINIC | Age: 74
End: 2024-07-31
Payer: COMMERCIAL

## 2024-07-31 DIAGNOSIS — R07.9 CHEST PAIN, UNSPECIFIED TYPE: ICD-10-CM

## 2024-07-31 LAB
CHOLEST SERPL-MCNC: 149 MG/DL
FASTING STATUS PATIENT QL REPORTED: YES
HDLC SERPL-MCNC: 58 MG/DL
LDLC SERPL CALC-MCNC: 74 MG/DL
NONHDLC SERPL-MCNC: 91 MG/DL
TRIGL SERPL-MCNC: 87 MG/DL

## 2024-07-31 PROCEDURE — 80061 LIPID PANEL: CPT

## 2024-07-31 PROCEDURE — 36415 COLL VENOUS BLD VENIPUNCTURE: CPT

## 2024-08-02 ENCOUNTER — HOSPITAL ENCOUNTER (OUTPATIENT)
Dept: CARDIOLOGY | Facility: CLINIC | Age: 74
Discharge: HOME | End: 2024-08-02
Attending: INTERNAL MEDICINE | Admitting: INTERNAL MEDICINE
Payer: COMMERCIAL

## 2024-08-02 DIAGNOSIS — R07.9 EXERTIONAL CHEST PAIN: ICD-10-CM

## 2024-08-02 DIAGNOSIS — R94.31 ABNORMAL ELECTROCARDIOGRAM: ICD-10-CM

## 2024-08-02 LAB
CV STRESS CURRENT BP HE: NORMAL
CV STRESS CURRENT HR HE: 104
CV STRESS CURRENT HR HE: 107
CV STRESS CURRENT HR HE: 108
CV STRESS CURRENT HR HE: 110
CV STRESS CURRENT HR HE: 112
CV STRESS CURRENT HR HE: 113
CV STRESS CURRENT HR HE: 120
CV STRESS CURRENT HR HE: 121
CV STRESS CURRENT HR HE: 124
CV STRESS CURRENT HR HE: 126
CV STRESS CURRENT HR HE: 128
CV STRESS CURRENT HR HE: 128
CV STRESS CURRENT HR HE: 129
CV STRESS CURRENT HR HE: 129
CV STRESS CURRENT HR HE: 76
CV STRESS CURRENT HR HE: 77
CV STRESS CURRENT HR HE: 80
CV STRESS CURRENT HR HE: 81
CV STRESS CURRENT HR HE: 83
CV STRESS CURRENT HR HE: 84
CV STRESS CURRENT HR HE: 85
CV STRESS CURRENT HR HE: 89
CV STRESS CURRENT HR HE: 94
CV STRESS CURRENT HR HE: 97
CV STRESS CURRENT HR HE: 97
CV STRESS CURRENT HR HE: 99
CV STRESS CURRENT HR HE: 99
CV STRESS DEVIATION TIME HE: NORMAL
CV STRESS ECHO PERCENT HR HE: NORMAL
CV STRESS EXERCISE STAGE HE: NORMAL
CV STRESS EXERCISE STAGE REACHED HE: NORMAL
CV STRESS FINAL RESTING BP HE: NORMAL
CV STRESS FINAL RESTING HR HE: 76
CV STRESS MAX HR HE: 131
CV STRESS MAX TREADMILL GRADE HE: 14
CV STRESS MAX TREADMILL SPEED HE: 3.4
CV STRESS PEAK DIA BP HE: NORMAL
CV STRESS PEAK SYS BP HE: NORMAL
CV STRESS PHASE HE: NORMAL
CV STRESS PROTOCOL HE: NORMAL
CV STRESS REASON STOPPED HE: NORMAL
CV STRESS RESTING PT POSITION HE: NORMAL
CV STRESS RESTING PT POSITION HE: NORMAL
CV STRESS ST DEVIATION AMOUNT HE: NORMAL
CV STRESS ST DEVIATION ELEVATION HE: NORMAL
CV STRESS ST EVELATION AMOUNT HE: NORMAL
CV STRESS SYMPTOMS HE: NORMAL
CV STRESS TEST TYPE HE: NORMAL
CV STRESS TOTAL STAGE TIME MIN 1 HE: NORMAL
STRESS ECHO BASELINE DIASTOLIC HE: 67
STRESS ECHO BASELINE HR: 92
STRESS ECHO BASELINE SYSTOLIC BP: 106
STRESS ECHO LAST STRESS DIASTOLIC BP: 60
STRESS ECHO LAST STRESS HR: 129
STRESS ECHO LAST STRESS SYSTOLIC BP: 150
STRESS ECHO POST ESTIMATED WORKLOAD: 10.3
STRESS ECHO POST EXERCISE DUR MIN: 8
STRESS ECHO POST EXERCISE DUR SEC: 30
STRESS ECHO TARGET HR: 127

## 2024-08-02 PROCEDURE — 93350 STRESS TTE ONLY: CPT | Mod: 26 | Performed by: INTERNAL MEDICINE

## 2024-08-02 PROCEDURE — 93321 DOPPLER ECHO F-UP/LMTD STD: CPT | Mod: 26 | Performed by: INTERNAL MEDICINE

## 2024-08-02 PROCEDURE — 93018 CV STRESS TEST I&R ONLY: CPT | Performed by: INTERNAL MEDICINE

## 2024-08-02 PROCEDURE — 93016 CV STRESS TEST SUPVJ ONLY: CPT | Performed by: INTERNAL MEDICINE

## 2024-08-02 PROCEDURE — 93325 DOPPLER ECHO COLOR FLOW MAPG: CPT | Mod: 26 | Performed by: INTERNAL MEDICINE

## 2024-08-02 PROCEDURE — 93325 DOPPLER ECHO COLOR FLOW MAPG: CPT | Mod: TC

## 2024-08-02 PROCEDURE — 93350 STRESS TTE ONLY: CPT | Mod: TC

## 2024-08-03 ENCOUNTER — HEALTH MAINTENANCE LETTER (OUTPATIENT)
Age: 74
End: 2024-08-03

## 2025-08-16 ENCOUNTER — HEALTH MAINTENANCE LETTER (OUTPATIENT)
Age: 75
End: 2025-08-16